# Patient Record
Sex: MALE | Race: WHITE | NOT HISPANIC OR LATINO | URBAN - METROPOLITAN AREA
[De-identification: names, ages, dates, MRNs, and addresses within clinical notes are randomized per-mention and may not be internally consistent; named-entity substitution may affect disease eponyms.]

---

## 2017-01-03 ENCOUNTER — OFFICE VISIT - HEALTHEAST (OUTPATIENT)
Dept: GERIATRICS | Facility: CLINIC | Age: 82
End: 2017-01-03

## 2017-01-03 DIAGNOSIS — F02.80 ALZHEIMER'S TYPE DEMENTIA WITH LATE ONSET WITHOUT BEHAVIORAL DISTURBANCE (H): ICD-10-CM

## 2017-01-03 DIAGNOSIS — I48.20 CHRONIC ATRIAL FIBRILLATION (H): ICD-10-CM

## 2017-01-03 DIAGNOSIS — S72.002D CLOSED FRACTURE OF NECK OF LEFT FEMUR WITH ROUTINE HEALING: ICD-10-CM

## 2017-01-03 DIAGNOSIS — R60.9 EDEMA: ICD-10-CM

## 2017-01-03 DIAGNOSIS — G30.1 ALZHEIMER'S TYPE DEMENTIA WITH LATE ONSET WITHOUT BEHAVIORAL DISTURBANCE (H): ICD-10-CM

## 2017-01-03 DIAGNOSIS — N18.30 CKD (CHRONIC KIDNEY DISEASE) STAGE 3, GFR 30-59 ML/MIN (H): ICD-10-CM

## 2017-01-03 DIAGNOSIS — I10 ESSENTIAL HYPERTENSION WITH GOAL BLOOD PRESSURE LESS THAN 140/90: ICD-10-CM

## 2017-01-03 DIAGNOSIS — N40.0 BENIGN NON-NODULAR PROSTATIC HYPERPLASIA, PRESENCE OF LOWER URINARY TRACT SYMPTOMS UNSPECIFIED: ICD-10-CM

## 2017-01-03 DIAGNOSIS — Z96.642 HISTORY OF HEMIARTHROPLASTY OF LEFT HIP: ICD-10-CM

## 2017-01-03 DIAGNOSIS — F41.1 ANXIETY, GENERALIZED: ICD-10-CM

## 2017-01-05 ENCOUNTER — OFFICE VISIT - HEALTHEAST (OUTPATIENT)
Dept: GERIATRICS | Facility: CLINIC | Age: 82
End: 2017-01-05

## 2017-01-05 DIAGNOSIS — F41.1 ANXIETY, GENERALIZED: ICD-10-CM

## 2017-01-05 DIAGNOSIS — I10 ESSENTIAL HYPERTENSION WITH GOAL BLOOD PRESSURE LESS THAN 140/90: ICD-10-CM

## 2017-01-05 DIAGNOSIS — Z96.642 HISTORY OF HEMIARTHROPLASTY OF LEFT HIP: ICD-10-CM

## 2017-01-05 DIAGNOSIS — G30.1 ALZHEIMER'S TYPE DEMENTIA WITH LATE ONSET WITHOUT BEHAVIORAL DISTURBANCE (H): ICD-10-CM

## 2017-01-05 DIAGNOSIS — I48.20 CHRONIC ATRIAL FIBRILLATION (H): ICD-10-CM

## 2017-01-05 DIAGNOSIS — N40.0 BENIGN NON-NODULAR PROSTATIC HYPERPLASIA, PRESENCE OF LOWER URINARY TRACT SYMPTOMS UNSPECIFIED: ICD-10-CM

## 2017-01-05 DIAGNOSIS — F02.80 ALZHEIMER'S TYPE DEMENTIA WITH LATE ONSET WITHOUT BEHAVIORAL DISTURBANCE (H): ICD-10-CM

## 2017-01-05 DIAGNOSIS — N18.30 CKD (CHRONIC KIDNEY DISEASE) STAGE 3, GFR 30-59 ML/MIN (H): ICD-10-CM

## 2017-01-05 DIAGNOSIS — S72.002D CLOSED FRACTURE OF NECK OF LEFT FEMUR WITH ROUTINE HEALING: ICD-10-CM

## 2017-01-05 RX ORDER — ACETAMINOPHEN 500 MG
1000 TABLET ORAL 3 TIMES DAILY
Status: SHIPPED | COMMUNITY
Start: 2017-01-05

## 2017-01-06 ENCOUNTER — AMBULATORY - HEALTHEAST (OUTPATIENT)
Dept: GERIATRICS | Facility: CLINIC | Age: 82
End: 2017-01-06

## 2017-01-10 ENCOUNTER — OFFICE VISIT - HEALTHEAST (OUTPATIENT)
Dept: GERIATRICS | Facility: CLINIC | Age: 82
End: 2017-01-10

## 2017-01-10 DIAGNOSIS — G30.1 ALZHEIMER'S TYPE DEMENTIA WITH LATE ONSET WITHOUT BEHAVIORAL DISTURBANCE (H): ICD-10-CM

## 2017-01-10 DIAGNOSIS — R60.9 EDEMA: ICD-10-CM

## 2017-01-10 DIAGNOSIS — I48.20 CHRONIC ATRIAL FIBRILLATION (H): ICD-10-CM

## 2017-01-10 DIAGNOSIS — F41.1 ANXIETY, GENERALIZED: ICD-10-CM

## 2017-01-10 DIAGNOSIS — I10 ESSENTIAL HYPERTENSION WITH GOAL BLOOD PRESSURE LESS THAN 140/90: ICD-10-CM

## 2017-01-10 DIAGNOSIS — N18.30 CKD (CHRONIC KIDNEY DISEASE) STAGE 3, GFR 30-59 ML/MIN (H): ICD-10-CM

## 2017-01-10 DIAGNOSIS — N40.0 BENIGN NON-NODULAR PROSTATIC HYPERPLASIA, PRESENCE OF LOWER URINARY TRACT SYMPTOMS UNSPECIFIED: ICD-10-CM

## 2017-01-10 DIAGNOSIS — S72.002D CLOSED FRACTURE OF NECK OF LEFT FEMUR WITH ROUTINE HEALING: ICD-10-CM

## 2017-01-10 DIAGNOSIS — F02.80 ALZHEIMER'S TYPE DEMENTIA WITH LATE ONSET WITHOUT BEHAVIORAL DISTURBANCE (H): ICD-10-CM

## 2017-01-10 DIAGNOSIS — Z96.642 HISTORY OF HEMIARTHROPLASTY OF LEFT HIP: ICD-10-CM

## 2017-01-10 RX ORDER — FUROSEMIDE 20 MG
20 TABLET ORAL
Status: SHIPPED | COMMUNITY
Start: 2017-01-10

## 2017-01-12 ENCOUNTER — AMBULATORY - HEALTHEAST (OUTPATIENT)
Dept: GERIATRICS | Facility: CLINIC | Age: 82
End: 2017-01-12

## 2017-01-12 ENCOUNTER — COMMUNICATION - HEALTHEAST (OUTPATIENT)
Dept: INTERNAL MEDICINE | Facility: CLINIC | Age: 82
End: 2017-01-12

## 2017-01-12 ENCOUNTER — COMMUNICATION - HEALTHEAST (OUTPATIENT)
Dept: GERIATRICS | Facility: CLINIC | Age: 82
End: 2017-01-12

## 2017-01-12 ENCOUNTER — AMBULATORY - HEALTHEAST (OUTPATIENT)
Dept: INTERNAL MEDICINE | Facility: CLINIC | Age: 82
End: 2017-01-12

## 2017-01-12 ENCOUNTER — RECORDS - HEALTHEAST (OUTPATIENT)
Dept: ADMINISTRATIVE | Facility: OTHER | Age: 82
End: 2017-01-12

## 2017-01-12 DIAGNOSIS — I48.20 CHRONIC ATRIAL FIBRILLATION (H): ICD-10-CM

## 2017-01-15 ENCOUNTER — COMMUNICATION - HEALTHEAST (OUTPATIENT)
Dept: INTERNAL MEDICINE | Facility: CLINIC | Age: 82
End: 2017-01-15

## 2017-01-16 RX ORDER — OXYCODONE HYDROCHLORIDE 5 MG/1
CAPSULE ORAL
Qty: 30 CAPSULE | Refills: 0 | Status: SHIPPED | OUTPATIENT
Start: 2017-01-16

## 2017-01-20 ENCOUNTER — RECORDS - HEALTHEAST (OUTPATIENT)
Dept: ADMINISTRATIVE | Facility: OTHER | Age: 82
End: 2017-01-20

## 2017-02-09 ENCOUNTER — RECORDS - HEALTHEAST (OUTPATIENT)
Dept: ADMINISTRATIVE | Facility: OTHER | Age: 82
End: 2017-02-09

## 2017-02-10 ENCOUNTER — COMMUNICATION - HEALTHEAST (OUTPATIENT)
Dept: INTERNAL MEDICINE | Facility: CLINIC | Age: 82
End: 2017-02-10

## 2017-05-10 ENCOUNTER — COMMUNICATION - HEALTHEAST (OUTPATIENT)
Dept: NURSING | Facility: CLINIC | Age: 82
End: 2017-05-10

## 2017-05-10 DIAGNOSIS — I48.20 CHRONIC ATRIAL FIBRILLATION (H): ICD-10-CM

## 2018-01-08 ENCOUNTER — RECORDS - HEALTHEAST (OUTPATIENT)
Dept: LAB | Facility: CLINIC | Age: 83
End: 2018-01-08

## 2018-01-09 LAB — INR PPP: 4.25 (ref 0.9–1.1)

## 2018-01-16 ENCOUNTER — RECORDS - HEALTHEAST (OUTPATIENT)
Dept: LAB | Facility: CLINIC | Age: 83
End: 2018-01-16

## 2018-01-16 LAB — INR PPP: 2.85 (ref 0.9–1.1)

## 2018-01-22 ENCOUNTER — RECORDS - HEALTHEAST (OUTPATIENT)
Dept: LAB | Facility: CLINIC | Age: 83
End: 2018-01-22

## 2018-01-23 LAB — INR PPP: 3.17 (ref 0.9–1.1)

## 2018-02-05 ENCOUNTER — RECORDS - HEALTHEAST (OUTPATIENT)
Dept: LAB | Facility: CLINIC | Age: 83
End: 2018-02-05

## 2018-02-06 LAB — INR PPP: 3.27 (ref 0.9–1.1)

## 2018-02-08 ENCOUNTER — RECORDS - HEALTHEAST (OUTPATIENT)
Dept: LAB | Facility: CLINIC | Age: 83
End: 2018-02-08

## 2018-02-08 LAB
ERYTHROCYTE [DISTWIDTH] IN BLOOD BY AUTOMATED COUNT: 13.6 % (ref 11–14.5)
HCT VFR BLD AUTO: 26.2 % (ref 40–54)
HGB BLD-MCNC: 8.3 G/DL (ref 14–18)
INR PPP: 2.18 (ref 0.9–1.1)
MCH RBC QN AUTO: 32.5 PG (ref 27–34)
MCHC RBC AUTO-ENTMCNC: 31.7 G/DL (ref 32–36)
MCV RBC AUTO: 103 FL (ref 80–100)
PLATELET # BLD AUTO: 204 THOU/UL (ref 140–440)
PMV BLD AUTO: 10.2 FL (ref 8.5–12.5)
RBC # BLD AUTO: 2.55 MILL/UL (ref 4.4–6.2)
WBC: 5.7 THOU/UL (ref 4–11)

## 2018-02-09 ENCOUNTER — RECORDS - HEALTHEAST (OUTPATIENT)
Dept: LAB | Facility: CLINIC | Age: 83
End: 2018-02-09

## 2018-02-09 LAB
BASOPHILS # BLD AUTO: 0 THOU/UL (ref 0–0.2)
BASOPHILS NFR BLD AUTO: 0 % (ref 0–2)
EOSINOPHIL # BLD AUTO: 0 THOU/UL (ref 0–0.4)
EOSINOPHIL NFR BLD AUTO: 1 % (ref 0–6)
ERYTHROCYTE [DISTWIDTH] IN BLOOD BY AUTOMATED COUNT: 13.8 % (ref 11–14.5)
HCT VFR BLD AUTO: 24.5 % (ref 40–54)
HGB BLD-MCNC: 7.6 G/DL (ref 14–18)
INR PPP: 2.49 (ref 0.9–1.1)
LYMPHOCYTES # BLD AUTO: 1.3 THOU/UL (ref 0.8–4.4)
LYMPHOCYTES NFR BLD AUTO: 27 % (ref 20–40)
MCH RBC QN AUTO: 31.8 PG (ref 27–34)
MCHC RBC AUTO-ENTMCNC: 31 G/DL (ref 32–36)
MCV RBC AUTO: 103 FL (ref 80–100)
MONOCYTES # BLD AUTO: 0.4 THOU/UL (ref 0–0.9)
MONOCYTES NFR BLD AUTO: 8 % (ref 2–10)
NEUTROPHILS # BLD AUTO: 3.1 THOU/UL (ref 2–7.7)
NEUTROPHILS NFR BLD AUTO: 65 % (ref 50–70)
PLATELET # BLD AUTO: 180 THOU/UL (ref 140–440)
PMV BLD AUTO: 10.5 FL (ref 8.5–12.5)
RBC # BLD AUTO: 2.39 MILL/UL (ref 4.4–6.2)
WBC: 4.8 THOU/UL (ref 4–11)

## 2018-02-15 ENCOUNTER — RECORDS - HEALTHEAST (OUTPATIENT)
Dept: LAB | Facility: CLINIC | Age: 83
End: 2018-02-15

## 2018-02-15 LAB
ANION GAP SERPL CALCULATED.3IONS-SCNC: 4 MMOL/L (ref 5–18)
BASOPHILS # BLD AUTO: 0 THOU/UL (ref 0–0.2)
BASOPHILS NFR BLD AUTO: 0 % (ref 0–2)
BUN SERPL-MCNC: 28 MG/DL (ref 8–28)
CALCIUM SERPL-MCNC: 8.2 MG/DL (ref 8.5–10.5)
CHLORIDE BLD-SCNC: 107 MMOL/L (ref 98–107)
CO2 SERPL-SCNC: 29 MMOL/L (ref 22–31)
CREAT SERPL-MCNC: 1.29 MG/DL (ref 0.7–1.3)
EOSINOPHIL # BLD AUTO: 0 THOU/UL (ref 0–0.4)
EOSINOPHIL NFR BLD AUTO: 0 % (ref 0–6)
ERYTHROCYTE [DISTWIDTH] IN BLOOD BY AUTOMATED COUNT: 13.9 % (ref 11–14.5)
GFR SERPL CREATININE-BSD FRML MDRD: 52 ML/MIN/1.73M2
GLUCOSE BLD-MCNC: 95 MG/DL (ref 70–125)
HCT VFR BLD AUTO: 20.2 % (ref 40–54)
HGB BLD-MCNC: 6.1 G/DL (ref 14–18)
LYMPHOCYTES # BLD AUTO: 1.1 THOU/UL (ref 0.8–4.4)
LYMPHOCYTES NFR BLD AUTO: 19 % (ref 20–40)
MCH RBC QN AUTO: 31.1 PG (ref 27–34)
MCHC RBC AUTO-ENTMCNC: 30.2 G/DL (ref 32–36)
MCV RBC AUTO: 103 FL (ref 80–100)
MONOCYTES # BLD AUTO: 0.4 THOU/UL (ref 0–0.9)
MONOCYTES NFR BLD AUTO: 7 % (ref 2–10)
NEUTROPHILS # BLD AUTO: 4.1 THOU/UL (ref 2–7.7)
NEUTROPHILS NFR BLD AUTO: 73 % (ref 50–70)
PLATELET # BLD AUTO: 158 THOU/UL (ref 140–440)
PMV BLD AUTO: 10.5 FL (ref 8.5–12.5)
POTASSIUM BLD-SCNC: 4.4 MMOL/L (ref 3.5–5)
RBC # BLD AUTO: 1.96 MILL/UL (ref 4.4–6.2)
SODIUM SERPL-SCNC: 140 MMOL/L (ref 136–145)
WBC: 5.7 THOU/UL (ref 4–11)

## 2018-02-16 ENCOUNTER — RECORDS - HEALTHEAST (OUTPATIENT)
Dept: LAB | Facility: CLINIC | Age: 83
End: 2018-02-16

## 2018-02-16 LAB
BASOPHILS # BLD AUTO: 0 THOU/UL (ref 0–0.2)
BASOPHILS NFR BLD AUTO: 0 % (ref 0–2)
EOSINOPHIL # BLD AUTO: 0 THOU/UL (ref 0–0.4)
EOSINOPHIL NFR BLD AUTO: 1 % (ref 0–6)
ERYTHROCYTE [DISTWIDTH] IN BLOOD BY AUTOMATED COUNT: 13.9 % (ref 11–14.5)
HCT VFR BLD AUTO: 20.1 % (ref 40–54)
HGB BLD-MCNC: 6.2 G/DL (ref 14–18)
LYMPHOCYTES # BLD AUTO: 1 THOU/UL (ref 0.8–4.4)
LYMPHOCYTES NFR BLD AUTO: 19 % (ref 20–40)
MCH RBC QN AUTO: 32 PG (ref 27–34)
MCHC RBC AUTO-ENTMCNC: 30.8 G/DL (ref 32–36)
MCV RBC AUTO: 104 FL (ref 80–100)
MONOCYTES # BLD AUTO: 0.3 THOU/UL (ref 0–0.9)
MONOCYTES NFR BLD AUTO: 6 % (ref 2–10)
NEUTROPHILS # BLD AUTO: 3.6 THOU/UL (ref 2–7.7)
NEUTROPHILS NFR BLD AUTO: 74 % (ref 50–70)
PLATELET # BLD AUTO: 177 THOU/UL (ref 140–440)
PMV BLD AUTO: 10.5 FL (ref 8.5–12.5)
RBC # BLD AUTO: 1.94 MILL/UL (ref 4.4–6.2)
WBC: 4.9 THOU/UL (ref 4–11)

## 2018-02-22 ENCOUNTER — RECORDS - HEALTHEAST (OUTPATIENT)
Dept: LAB | Facility: CLINIC | Age: 83
End: 2018-02-22

## 2018-02-22 LAB — HGB BLD-MCNC: 6.4 G/DL (ref 14–18)

## 2018-03-07 ENCOUNTER — RECORDS - HEALTHEAST (OUTPATIENT)
Dept: LAB | Facility: CLINIC | Age: 83
End: 2018-03-07

## 2018-03-08 LAB — HGB BLD-MCNC: 8 G/DL (ref 14–18)

## 2018-04-04 ENCOUNTER — RECORDS - HEALTHEAST (OUTPATIENT)
Dept: LAB | Facility: CLINIC | Age: 83
End: 2018-04-04

## 2018-04-05 LAB — HGB BLD-MCNC: 9.3 G/DL (ref 14–18)

## 2018-06-15 ENCOUNTER — RECORDS - HEALTHEAST (OUTPATIENT)
Dept: LAB | Facility: CLINIC | Age: 83
End: 2018-06-15

## 2018-06-15 LAB
ALBUMIN UR-MCNC: ABNORMAL MG/DL
APPEARANCE UR: CLEAR
BACTERIA #/AREA URNS HPF: ABNORMAL HPF
BILIRUB UR QL STRIP: NEGATIVE
COLOR UR AUTO: YELLOW
GLUCOSE UR STRIP-MCNC: NEGATIVE MG/DL
HGB UR QL STRIP: NEGATIVE
KETONES UR STRIP-MCNC: NEGATIVE MG/DL
LEUKOCYTE ESTERASE UR QL STRIP: NEGATIVE
MUCOUS THREADS #/AREA URNS LPF: ABNORMAL LPF
NITRATE UR QL: NEGATIVE
PH UR STRIP: 5.5 [PH] (ref 4.5–8)
RBC #/AREA URNS AUTO: ABNORMAL HPF
SP GR UR STRIP: 1.02 (ref 1–1.03)
SQUAMOUS #/AREA URNS AUTO: ABNORMAL LPF
UROBILINOGEN UR STRIP-ACNC: ABNORMAL
WBC #/AREA URNS AUTO: ABNORMAL HPF

## 2018-06-16 LAB — BACTERIA SPEC CULT: NORMAL

## 2018-06-28 ENCOUNTER — RECORDS - HEALTHEAST (OUTPATIENT)
Dept: LAB | Facility: CLINIC | Age: 83
End: 2018-06-28

## 2018-06-28 LAB
ALBUMIN UR-MCNC: NEGATIVE MG/DL
APPEARANCE UR: CLEAR
BACTERIA #/AREA URNS HPF: ABNORMAL HPF
BILIRUB UR QL STRIP: NEGATIVE
COLOR UR AUTO: YELLOW
GLUCOSE UR STRIP-MCNC: NEGATIVE MG/DL
HGB UR QL STRIP: NEGATIVE
KETONES UR STRIP-MCNC: NEGATIVE MG/DL
LEUKOCYTE ESTERASE UR QL STRIP: ABNORMAL
MUCOUS THREADS #/AREA URNS LPF: ABNORMAL LPF
NITRATE UR QL: NEGATIVE
PH UR STRIP: 5 [PH] (ref 4.5–8)
RBC #/AREA URNS AUTO: ABNORMAL HPF
SP GR UR STRIP: 1.02 (ref 1–1.03)
SQUAMOUS #/AREA URNS AUTO: ABNORMAL LPF
UROBILINOGEN UR STRIP-ACNC: ABNORMAL
WBC #/AREA URNS AUTO: ABNORMAL HPF

## 2018-06-29 LAB — BACTERIA SPEC CULT: NO GROWTH

## 2018-09-01 ENCOUNTER — RECORDS - HEALTHEAST (OUTPATIENT)
Dept: LAB | Facility: CLINIC | Age: 83
End: 2018-09-01

## 2018-09-01 LAB — HGB BLD-MCNC: 10.3 G/DL (ref 14–18)

## 2021-05-30 ENCOUNTER — RECORDS - HEALTHEAST (OUTPATIENT)
Dept: ADMINISTRATIVE | Facility: CLINIC | Age: 86
End: 2021-05-30

## 2021-05-30 VITALS — WEIGHT: 194.4 LBS | BODY MASS INDEX: 26.37 KG/M2

## 2021-05-30 VITALS — WEIGHT: 191.3 LBS | BODY MASS INDEX: 25.94 KG/M2

## 2021-05-30 VITALS — BODY MASS INDEX: 26.37 KG/M2 | WEIGHT: 194.4 LBS

## 2021-06-08 NOTE — PROGRESS NOTES
Code Status:  FULL CODE  Visit Type: Discharge Summary     Facility:  [facility] [[id number]]         Facility Type: SNF (Skilled Nursing Facility, TCU)    History of Present Illness: Germán Barraza Jr. is a 89 y.o. male with recent fall sustaining a left femoral neck fracture. Pt underwent left hip hemiarthroplasty on 12/17. Post operatively he has some  some brief hypotension secondary to volume depletion responding to IV fluids and discontinuation of lisinopril/HCTZ. Postop acute blood loss anemia. Today hemoglobin 8.2 up from 7.8. He is on a bowel regimen including MiraLAX and stool softeners. Warfarin is being used for DVT prophylaxis and he should continue for 2 weeks before switching to aspirin 325 mg daily. He remains on his Namenda and Zoloft for dementia and anxiety. He is incontinent of urine. Recent increase of pain and edema to LE extending up to thigh with LE edema. VS doppler negative for DVT. He is being treated with Lasix.     Active Ambulatory Problems     Diagnosis Date Noted     Chronic atrial fibrillation      Unsteady gait 09/08/2016     Anxiety, generalized 10/06/2016     BPH (benign prostatic hypertrophy) 10/06/2016     Closed fracture of neck of left femur with routine healing 12/16/2016     CKD (chronic kidney disease) stage 3, GFR 30-59 ml/min 12/17/2016     Essential hypertension      Alzheimer's type dementia with late onset without behavioral disturbance 09/08/2016     History of hemiarthroplasty of left hip 12/21/2016     Resolved Ambulatory Problems     Diagnosis Date Noted     Increased urinary frequency 10/30/2015     Dysuria 10/30/2015     BPH (benign prostatic hyperplasia)      Acute low back pain 03/01/2016     Elevated serum creatinine 09/09/2016     Past Medical History   Diagnosis Date     Anxiety      Balanitis      Penile disorder        Current Outpatient Prescriptions   Medication Sig     furosemide (LASIX) 20 MG tablet Take 20 mg by mouth 2 (two)  times a day at 9am and 6pm.     acetaminophen (TYLENOL) 500 MG tablet Take 1,000 mg by mouth 3 (three) times a day.     memantine (NAMENDA XR) 28 mg CSpX Take 28 mg by mouth every evening.     oxyCODONE (OXY-IR) 5 mg capsule Take 2.5 mg by mouth every 6 (six) hours as needed.     polyethylene glycol (MIRALAX) 17 gram packet Take 1 packet (17 g total) by mouth 2 (two) times a day.     senna-docusate (PERICOLACE) 8.6-50 mg tablet Take 2 tablets by mouth 2 (two) times a day.     sertraline (ZOLOFT) 25 MG tablet Take 25 mg by mouth every evening.     tamsulosin (FLOMAX) 0.4 mg Cp24 Take 0.8 mg by mouth bedtime.      WARFARIN SODIUM (WARFARIN ORAL) Take by mouth daily. 1/6/17 INR 1.65  Continue taking 2mg daily.  Next INR 1/10/17.       Allergies   Allergen Reactions     Aspirin      Abd pain       Cardizem [Diltiazem Hcl]      Constipation     Penicillins Hives         Review of Systems   No fevers or chills. No headache, lightheadedness or dizziness. No SOB, chest pains or palpitations. Appetite is good. No nausea, vomiting, constipation or diarrhea. No dysuria, frequency, burning or pain with urination. Increased pain and edema to LLE. Pain in back of leg per pt.        Physical Exam   PHYSICAL EXAMINATION:  Vital signs:   Vitals:    01/10/17 1246   BP: 144/78   Pulse: 74   Resp: 16   Temp: 97.9  F (36.6  C)   SpO2: 99%     General: Awake, Alert, appropriately, follows simple commands, conversant  HEENT:PERRLA, Pink conjunctiva, anicteric sclerae, moist oral mucosa  NECK: Supple, without any lymphadenopathy, or masses  CVS:  S1  S2, without murmur or gallop.   LUNG: Clear to auscultation, No wheezes, rales or rhonci.  BACK: No kyphosis of the thoracic spine  ABDOMEN: Soft, obese, nontender to palpation, with positive bowel sounds  EXTREMITIES: Moves both upper and lower extremities with generalized weakness. 3+  pedal edema extending up to back of thigh, no calf tenderness.   SKIN: Warm and dry, no rashes or erythema  noted. Incision to left hip dry and intact. Moderate edema surrounding incision.   NEUROLOGIC: Intact, pulses palpable but faint.   PSYCHIATRIC: Cognition impaired.           Labs:      Recent Results (from the past 240 hour(s))   INR   Result Value Ref Range    INR 1.86 (H) 0.90 - 1.10   INR   Result Value Ref Range    INR 1.68 (H) 0.90 - 1.10   INR   Result Value Ref Range    INR 1.80 (H) 0.90 - 1.10   Basic Metabolic Panel   Result Value Ref Range    Sodium 140 136 - 145 mmol/L    Potassium 4.4 3.5 - 5.0 mmol/L    Chloride 110 (H) 98 - 107 mmol/L    CO2 25 22 - 31 mmol/L    Anion Gap, Calculation 5 5 - 18 mmol/L    Glucose 95 70 - 125 mg/dL    Calcium 8.1 (L) 8.5 - 10.5 mg/dL    BUN 24 8 - 28 mg/dL    Creatinine 1.15 0.70 - 1.30 mg/dL    GFR MDRD Af Amer >60 >60 mL/min/1.73m2    GFR MDRD Non Af Amer 60 (L) >60 mL/min/1.73m2   HM2(CBC w/o Differential)   Result Value Ref Range    WBC 5.1 4.0 - 11.0 thou/uL    RBC 2.52 (L) 4.40 - 6.20 mill/uL    Hemoglobin 7.9 (L) 14.0 - 18.0 g/dL    Hematocrit 25.4 (L) 40.0 - 54.0 %     (H) 80 - 100 fL    MCH 31.3 27.0 - 34.0 pg    MCHC 31.1 (L) 32.0 - 36.0 g/dL    RDW 14.2 11.0 - 14.5 %    Platelets 246 140 - 440 thou/uL    MPV 9.8 8.5 - 12.5 fL   INR   Result Value Ref Range    INR 1.65 (H) 0.90 - 1.10     Results for orders placed or performed in visit on 01/05/17   Basic Metabolic Panel   Result Value Ref Range    Sodium 140 136 - 145 mmol/L    Potassium 4.4 3.5 - 5.0 mmol/L    Chloride 110 (H) 98 - 107 mmol/L    CO2 25 22 - 31 mmol/L    Anion Gap, Calculation 5 5 - 18 mmol/L    Glucose 95 70 - 125 mg/dL    Calcium 8.1 (L) 8.5 - 10.5 mg/dL    BUN 24 8 - 28 mg/dL    Creatinine 1.15 0.70 - 1.30 mg/dL    GFR MDRD Af Amer >60 >60 mL/min/1.73m2    GFR MDRD Non Af Amer 60 (L) >60 mL/min/1.73m2           Assessment/Plan:  1. Closed fracture of neck of left femur with routine healing     2. History of hemiarthroplasty of left hip     3. Chronic atrial fibrillation     4.  Alzheimer's type dementia with late onset without behavioral disturbance     5. Essential hypertension with goal blood pressure less than 140/90     6. Anxiety, generalized     7. Benign non-nodular prostatic hyperplasia, presence of lower urinary tract symptoms unspecified     8. CKD (chronic kidney disease) stage 3, GFR 30-59 ml/min     9. Edema             Pt with recent fall sustaining left hip fracture s/p unipolar hemiarthroplasty.He is had an additional fall while in the TCU without injury. He is moving slowly. Pain controlled with Oxycodone. Continued LE edema. Recent VS doppler negative for DVT. He continues on Coumadin for hx of atrial fib.INR pending. I will also reinstate his tylenol 1000mg TID. CKD. Will monitor electrolytes. Alzheimer. He was previously living in [place]. He is awaiting placement at [facility] in memory care.    Pt may discharge with current meds, treatment and narcotics when bed available.   PT, OT, HHA and RN.    Electronically signed by: Emilee Chong, [place]

## 2021-06-08 NOTE — PROGRESS NOTES
[facility]    DATE: 2017    NAME: Germán Barraza Jr.  : 1/15/1927           MR# [id number]     CODE STATUS:  FULL CODE      VISIT TYPE: Problem   FACILITY: [facility] [[id number]]    ROOM: [id number]    PRIMARY CARE PROVIDER: Jose Faust MD Phone: [phone] Fax:[phone]    History of Present Illness:   Germán Barraza Jr. is a 89 y.o. male with 16 unipolar hemiarthrll on his left hip. He has a Difficultly depressed slum score but is unfailingly pleasant and cooperative. He's had swelling in both lower extremities with negative ultrasound and attempt at Lasix to remove some of the swelling iHas met with limited success. I have continued the Lasix. Laboratory work is pending at the time of this dictation but includes a BMP to make sure that the Lasix is not overwhelming his kidneys. I would expect results later today, and certainly would be  Willing to modify his regimen if I start to see kidney effects.    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     Unsteady gait 2016       Allergies:  Allergies   Allergen Reactions     Aspirin      Abd pain       Cardizem [Diltiazem Hcl]      Constipation     Penicillins Hives       Current Medications:  Current Outpatient Prescriptions   Medication Sig     acetaminophen (TYLENOL) 500 MG tablet Take 1,000 mg by mouth 3 (three) times a day.     furosemide (LASIX) 20 MG  tablet Take 20 mg by mouth 2 (two) times a day.     memantine (NAMENDA XR) 28 mg CSpX Take 28 mg by mouth every evening.     oxyCODONE (OXY-IR) 5 mg capsule Take 2.5 mg by mouth every 6 (six) hours as needed.     polyethylene glycol (MIRALAX) 17 gram packet Take 1 packet (17 g total) by mouth 2 (two) times a day.     senna-docusate (PERICOLACE) 8.6-50 mg tablet Take 2 tablets by mouth 2 (two) times a day.     sertraline (ZOLOFT) 25 MG tablet Take 25 mg by mouth every evening.     tamsulosin (FLOMAX) 0.4 mg Cp24 Take 0.8 mg by mouth bedtime.      WARFARIN SODIUM (WARFARIN ORAL) Take 2 mg by mouth daily.        Review of Systems:  History obtained from chart review and the patient  Respiratory ROS: no cough, shortness of breath, or wheezing  Cardiovascular ROS: no chest pain or dyspnea on exertion  Gastrointestinal ROS: no abdominal pain, change in bowel habits, or black or bloody stools  Genito-Urinary ROS: no dysuria, trouble voiding, or hematuria  Musculoskeletal ROS: Tightness and swelling in both lower extremities left tighter and more swollen than right as would be expected from surgical intervention  Neurological ROS: no TIA or stroke symptoms  Dermatological ROS: he denies any skin lesions         Laboratory:  Recent Labs      01/05/17   0613   INR  1.65*     Have increased the Lasix in an effort to get him into the INR of 2 to 3 range.    Physical Examination:  Visit Vitals     /64     Pulse 82     Temp (!) 96.3  F (35.7  C)     Resp 16     Wt 194 lb 6.4 oz (88.2 kg)     SpO2 96%     BMI 26.37 kg/m2     General appearance: alert, appears stated age, cooperative, fatigued, flushed, no distress, moderately obese and slowed mentation  Neck: no adenopathy, no carotid bruit, no JVD, supple, symmetrical, trachea midline and thyroid not enlarged, symmetric, no tenderness/mass/nodules  Lungs: clear to auscultation bilaterally  Heart: regular rate and rhythm, S1, S2 normal, no murmur, click, rub or  gallop  Abdomen: soft, non-tender; bowel sounds normal; no masses,  no organomegaly and obese  Extremities: bilateral lower extremity swelling left much worse than right  Pulses: 2+ and symmetric  Skin: Skin color, texture, turgor normal. No rashes or lesions  Neurologic: Mental status: oriented only to person and unfailingly pleasant and cooperative  Motor:he moves all extremities but said hip surgery limits his left hip function. He does move but it is hard for him       Impression:  Germán Barraza Jr. is a 89 y.o. male with 12/17/16 unipolar hemiarthroplasty, late onset Alzheimer's dementia    1. Closed fracture of neck of left femur with routine healing     2. History of hemiarthroplasty of left hip     3. Chronic atrial fibrillation     4. Alzheimer's type dementia with late onset without behavioral disturbance     5. Essential hypertension with goal blood pressure less than 140/90     6. Anxiety, generalized     7. Benign non-nodular prostatic hyperplasia, presence of lower urinary tract symptoms unspecified     8. CKD (chronic kidney disease) stage 3, GFR 30-59 ml/min         Plan: Will work with him to maximize his recovery. I have continued the Lasix in hopes of getting some edema offers lower extremities but will watch his renal function carefully as I have no desire to cause more problems than I solve    Electronically signed by: Jose Perez Sr., MD

## 2021-06-08 NOTE — PROGRESS NOTES
Code Status:  FULL CODE  Visit Type: Problem Visit     Facility:  [facility] [[id number]]         Facility Type: SNF (Skilled Nursing Facility, TCU)    History of Present Illness: Germán Barraza Jr. is a 89 y.o. male with recent fall sustaining a left femoral neck fracture. Pt underwent left hip hemiarthroplasty on 12/17. Post operatively he has some  some brief hypotension secondary to volume depletion responding to IV fluids and discontinuation of lisinopril/HCTZ. Postop acute blood loss anemia.  He is on a bowel regimen including MiraLAX and stool softeners. Warfarin is being used for DVT prophylaxis and he should continue for 2 weeks before switching to aspirin 325 mg daily. He remains on his Namenda and Zoloft for dementia and anxiety. He is incontinent of urine. Recent increase of pain and edema to LE extending up to thigh.     Active Ambulatory Problems     Diagnosis Date Noted     Chronic atrial fibrillation      Unsteady gait 09/08/2016     Anxiety, generalized 10/06/2016     BPH (benign prostatic hypertrophy) 10/06/2016     Closed fracture of neck of left femur with routine healing 12/16/2016     CKD (chronic kidney disease) stage 3, GFR 30-59 ml/min 12/17/2016     Essential hypertension      Alzheimer's type dementia with late onset without behavioral disturbance 09/08/2016     History of hemiarthroplasty of left hip 12/21/2016     Resolved Ambulatory Problems     Diagnosis Date Noted     Increased urinary frequency 10/30/2015     Dysuria 10/30/2015     BPH (benign prostatic hyperplasia)      Acute low back pain 03/01/2016     Elevated serum creatinine 09/09/2016     Past Medical History   Diagnosis Date     Anxiety      Balanitis      Penile disorder        Current Outpatient Prescriptions   Medication Sig     memantine (NAMENDA XR) 28 mg CSpX Take 28 mg by mouth every evening.     polyethylene glycol (MIRALAX) 17 gram packet Take 1 packet (17 g total) by mouth 2 (two) times a  day.     senna-docusate (PERICOLACE) 8.6-50 mg tablet Take 2 tablets by mouth 2 (two) times a day.     sertraline (ZOLOFT) 25 MG tablet Take 25 mg by mouth every evening.     tamsulosin (FLOMAX) 0.4 mg Cp24 Take 0.8 mg by mouth bedtime.      WARFARIN SODIUM (WARFARIN ORAL) Take by mouth. 12/27/16 INR 3.09  Take 2mg on Tuesdays, Thursdays, and Saturdays and 2.5mg all other days.  Next INR 12/29/16.       Allergies   Allergen Reactions     Aspirin      Abd pain       Cardizem [Diltiazem Hcl]      Constipation     Penicillins Hives         Review of Systems   No fevers or chills. No headache, lightheadedness or dizziness. No SOB, chest pains or palpitations. Appetite is good. No nausea, vomiting, constipation or diarrhea. No dysuria, frequency, burning or pain with urination. Increased pain and edema to LLE. Pain in back of leg per pt.        Physical Exam   PHYSICAL EXAMINATION:  Vital signs:   Vitals:    01/03/17 1647   BP: 137/64   Pulse: 82   Resp: 16   Temp: 98.3  F (36.8  C)   SpO2: 96%     General: Awake, Alert, appropriately, follows simple commands, conversant  HEENT:PERRLA, Pink conjunctiva, anicteric sclerae, moist oral mucosa  NECK: Supple, without any lymphadenopathy, or masses  CVS:  S1  S2, without murmur or gallop.   LUNG: Clear to auscultation, No wheezes, rales or rhonci.  BACK: No kyphosis of the thoracic spine  ABDOMEN: Soft, obese, nontender to palpation, with positive bowel sounds  EXTREMITIES: Moves both upper and lower extremities with generalized weakness, difficulty with applying weight to LLE and standing. Pain with SLR and dorsiflexion. 3-4+  pedal edema extending up to back of thigh, no calf tenderness. Skin tight and taunt.   SKIN: Warm and dry, no rashes or erythema noted. Incision to left hip dry and intact. Moderate edema surrounding incision.   NEUROLOGIC: Intact, pulses palpable but faint.   PSYCHIATRIC: Cognition impaired.           Labs:      Recent Results (from the past 240  hour(s))   INR   Result Value Ref Range    INR 3.09 (H) 0.90 - 1.10   Hemoglobin   Result Value Ref Range    Hemoglobin 8.7 (L) 14.0 - 18.0 g/dL   INR   Result Value Ref Range    INR 3.41 (H) 0.90 - 1.10   INR   Result Value Ref Range    INR 3.07 (H) 0.90 - 1.10   INR   Result Value Ref Range    INR 1.86 (H) 0.90 - 1.10   INR   Result Value Ref Range    INR 1.68 (H) 0.90 - 1.10   INR   Result Value Ref Range    INR 1.80 (H) 0.90 - 1.10     Results for orders placed or performed during the hospital encounter of 12/16/16   Basic Metabolic Panel   Result Value Ref Range    Sodium 131 (L) 136 - 145 mmol/L    Potassium 4.9 3.5 - 5.0 mmol/L    Chloride 103 98 - 107 mmol/L    CO2 20 (L) 22 - 31 mmol/L    Anion Gap, Calculation 8 5 - 18 mmol/L    Glucose 115 70 - 125 mg/dL    Calcium 8.3 (L) 8.5 - 10.5 mg/dL    BUN 35 (H) 8 - 28 mg/dL    Creatinine 1.50 (H) 0.70 - 1.30 mg/dL    GFR MDRD Af Amer 53 (L) >60 mL/min/1.73m2    GFR MDRD Non Af Amer 44 (L) >60 mL/min/1.73m2           Assessment/Plan:  1. Closed fracture of neck of left femur with routine healing     2. History of hemiarthroplasty of left hip     3. Edema     4. Chronic atrial fibrillation     5. Alzheimer's type dementia with late onset without behavioral disturbance     6. CKD (chronic kidney disease) stage 3, GFR 30-59 ml/min     7. Essential hypertension with goal blood pressure less than 140/90     8. Anxiety, generalized     9. Benign non-nodular prostatic hyperplasia, presence of lower urinary tract symptoms unspecified       Pt with recent fall sustaining left hip fracture s/p unipolar hemiarthroplasty. He is having increased pain and edema to LLE. Difficulty with SLR. Pain to back of knee and calf. Skin tight and taunt. I will check VS doppler to rule out DVT. He continues on Coumadin for hx of atrial fib.INR pending. I will also reinstate his tylenol 1000mg TID. CKD. Will monitor electrolytes. Alzheimer.     Electronically signed by: Emilee Chong,  CNP

## 2021-06-08 NOTE — PROGRESS NOTES
[facility] Patient Outreach:     Discharge Date::  1-11-17      Reason for TCU stay (discharge diagnosis)::  L femur fracture      Are you feeling better, the same or worse since your discharge?:  Patient is feeling the same          As part of your discharge plan, did they discuss home care with you?: No            Did you receive any new medications, or was there a change to your medications?: No (lives in memorycare)            : Md came to memory care to see him.